# Patient Record
Sex: FEMALE | Race: WHITE | Employment: UNEMPLOYED | URBAN - METROPOLITAN AREA
[De-identification: names, ages, dates, MRNs, and addresses within clinical notes are randomized per-mention and may not be internally consistent; named-entity substitution may affect disease eponyms.]

---

## 2018-07-17 ENCOUNTER — OFFICE VISIT (OUTPATIENT)
Dept: ORTHOPEDIC SURGERY | Age: 43
End: 2018-07-17

## 2018-07-17 VITALS
HEIGHT: 66 IN | SYSTOLIC BLOOD PRESSURE: 118 MMHG | BODY MASS INDEX: 23.46 KG/M2 | DIASTOLIC BLOOD PRESSURE: 76 MMHG | WEIGHT: 146 LBS

## 2018-07-17 DIAGNOSIS — S93.315A: Primary | ICD-10-CM

## 2018-07-17 DIAGNOSIS — M25.572 ACUTE LEFT ANKLE PAIN: ICD-10-CM

## 2018-07-17 PROCEDURE — G8420 CALC BMI NORM PARAMETERS: HCPCS | Performed by: INTERNAL MEDICINE

## 2018-07-17 PROCEDURE — G8427 DOCREV CUR MEDS BY ELIG CLIN: HCPCS | Performed by: INTERNAL MEDICINE

## 2018-07-17 PROCEDURE — 1036F TOBACCO NON-USER: CPT | Performed by: INTERNAL MEDICINE

## 2018-07-17 PROCEDURE — 99203 OFFICE O/P NEW LOW 30 MIN: CPT | Performed by: INTERNAL MEDICINE

## 2018-07-17 NOTE — PROGRESS NOTES
lower extremity does not show any tenderness, deformity or injury. Range of motion is unremarkable. There is no gross instability. There are no rashes, ulcerations or lesions. Strength and tone are normal.     Neurolgic -Light touch sensation and manual muscle testing normal L2-S1. No fasiculations. Pattella tendon and Achilles tendon reflexes +2 bilaterally.   Seated SLR negative        Office Imaging Results/Procedures PerformedToday:        EXAMINATION:   3 XRAY VIEWS OF THE LEFT FOOT       7/13/2018 6:48 pm       COMPARISON:   Ankle radiograph from the same day       HISTORY:   ORDERING PHYSICIAN PROVIDED HISTORY: reduced   TECHNOLOGIST PROVIDED HISTORY:   Technologist Provided Reason for Exam: fell       FINDINGS:   The foot and ankle are in a splint.  There has been anatomic reduction of the   subtalar joint.  Question small fracture of the anterior process of the   calcaneus.           Impression   Reduction of subtalar dislocation.  Suspected small fracture of the anterior   process of the calcaneus           EXAMINATION:   2 XRAY VIEWS OF THE LEFT ANKLE       7/13/2018 6:02 pm       COMPARISON:   None.       HISTORY:   ORDERING PHYSICIAN PROVIDED HISTORY: fall ankle injury   TECHNOLOGIST PROVIDED HISTORY:   Technologist Provided Reason for Exam: fall down steps   Acuity: Acute   Type of Encounter: Initial       FINDINGS:   A lateral and limited the mortise view are submitted.  No fracture or   dislocation of the bones of the ankle are demonstrated.  There is an abnormal   relationship between the talus and calcaneus, with the dome of the talus at   the level of the superior aspect of the calcaneus, and anterior displacement   of the talus relative to the calcaneus.           Impression   No ankle fracture or dislocation.  Abnormal talocalcaneal relationship   suggesting subtalar dislocation.  Recommend dedicated views of the foot     EXAMINATION:   2 XRAY VIEWS OF THE LEFT ANKLE       7/13/2018 6:02 pm and all questions were answered. the patient indicated understanding and satisfaction with the discussion. Orders:        Orders Placed This Encounter   Procedures    MRI Ankle Left WO Contrast     Standing Status:   Future     Standing Expiration Date:   7/17/2019     Scheduling Instructions:      Gloria Eubanks schedule upon approval     Order Specific Question:   Reason for exam:     Answer:   subtalar dislocation r/o OCD fx           Disclaimer: \"This note was dictated with voice recognition software. Though review and correction are routine, we apologize for any errors. \"

## 2018-07-19 ENCOUNTER — TELEPHONE (OUTPATIENT)
Dept: ORTHOPEDIC SURGERY | Age: 43
End: 2018-07-19

## 2018-07-25 ENCOUNTER — OFFICE VISIT (OUTPATIENT)
Dept: ORTHOPEDIC SURGERY | Age: 43
End: 2018-07-25

## 2018-07-25 VITALS — HEIGHT: 66 IN | BODY MASS INDEX: 23.46 KG/M2 | WEIGHT: 146 LBS

## 2018-07-25 DIAGNOSIS — S93.315A CLOSED TRAUMATIC DISLOCATION OF LEFT SUBTALAR JOINT, INITIAL ENCOUNTER: Primary | ICD-10-CM

## 2018-07-25 DIAGNOSIS — Q66.89 COALITION, CALCANEUS NAVICULAR: ICD-10-CM

## 2018-07-25 PROCEDURE — L4361 PNEUMA/VAC WALK BOOT PRE OTS: HCPCS | Performed by: INTERNAL MEDICINE

## 2018-07-25 PROCEDURE — 99214 OFFICE O/P EST MOD 30 MIN: CPT | Performed by: INTERNAL MEDICINE

## 2018-07-25 PROCEDURE — G8427 DOCREV CUR MEDS BY ELIG CLIN: HCPCS | Performed by: INTERNAL MEDICINE

## 2018-07-25 PROCEDURE — 1036F TOBACCO NON-USER: CPT | Performed by: INTERNAL MEDICINE

## 2018-07-25 PROCEDURE — G8420 CALC BMI NORM PARAMETERS: HCPCS | Performed by: INTERNAL MEDICINE

## 2018-07-25 NOTE — PROGRESS NOTES
assessed      Neurovascular - non focal and intact       Additional Comments:        Additional Examinations:                Office Imaging Results/Procedures PerformedToday:   MRI reviewed by myself and corroborated with the report         [facility]           Patient Name: Claritza Roberts   Case ID: [id number]   Patient : 1975   Referring Physician: Bambi Patel MD   Exam Date: 2018   Exam Description: MR Left Ankle w/o Contrast            HISTORY:  Evaluate OCD.       TECHNICAL FACTORS:  Long- and short-axis fat- and water-weighted images were performed. 1.5T    High Field Oval.       COMPARISON:  None.       FINDINGS:  Small areas of microtrabecular fracture of posteromedial and posterolateral aspects    of the talus.  No osteochondral defects on the talar dome.  Cartilaginous or fibrous coalition    of calcaneonavicular process with osseous erosion at the synchondrosis.  Contused lateral    malleolus.       Achilles tendon intact.  Mild-moderate inflammation of the central cord plantar fascia with    mild stress edema of the adjacent calcaneus.       Chronic mild-moderate insertional tendinopathy of posterior tibialis tendon, no tear.  Flexor    digitorum and hallucis tendon intact.  Moderate-large amount of fluid in the flexor hallucis    tendon sheath at knot of Chidi Mates tendons and anterior tendon group intact.       Tear of superficial fibers of deltoid ligament complex.  The deep tibiotalar ligament intact.     Sprained cervical ligament and interosseous ligament with mild osseous edema of adjacent talus    and calcaneus.  Sprain medial and lateral retinaculum.  The anterior tibiofibular ligament,    anterior talofibular ligament and calcaneofibular ligament intact.  Diffuse severe soft tissue    edema.       CONCLUSION:   1. Tear of superficial deltoid ligament complex.    2. High grade sprain of interosseous and cervical ligaments in tell stretch is handouts provided  Scheduled daily aspirin 81 mg daily for one more week when necessary thereafter  Medical pain management as per previous    Greater than half the time related to patient's visit was spent counseling the patient with respect to alternatives of treatment and options for treatment and complications and risks related to those treatment options and expectations related to recovery and outcomes relative to those treatment options     Orders:        Orders Placed This Encounter   Procedures    Airselect Tall Pneumatic Walking Boot     Patient was prescribed a Magalene Aase Tall Walking Boot. The left will require stabilization / immobilization from this semi-rigid / rigid orthosis to improve their function. The orthosis will assist in protecting the affected area, provide functional support and facilitate healing. Patient was instructed to progress ambulation  as partial weight bearing in the device. The patient was educated and fit by a healthcare professional with expert knowledge and specialization in brace application while under the direct supervision of the physician. Verbal and written instructions for the use of and application of this item were provided. They were instructed to contact the office immediately should the brace result in increased pain, decreased sensation, increased swelling or worsening of the condition. Vielka Doan MD.      Disclaimer: \"This note was dictated with voice recognition software. Though review and correction are routine, we apologize for any errors. \"

## 2018-07-26 ENCOUNTER — TELEPHONE (OUTPATIENT)
Dept: ORTHOPEDIC SURGERY | Age: 43
End: 2018-07-26

## 2018-08-06 ENCOUNTER — OFFICE VISIT (OUTPATIENT)
Dept: ORTHOPEDIC SURGERY | Age: 43
End: 2018-08-06

## 2018-08-06 VITALS — WEIGHT: 146 LBS | HEIGHT: 66 IN | BODY MASS INDEX: 23.46 KG/M2

## 2018-08-06 DIAGNOSIS — Q66.89 COALITION, CALCANEUS NAVICULAR: ICD-10-CM

## 2018-08-06 DIAGNOSIS — S93.315A CLOSED TRAUMATIC DISLOCATION OF LEFT SUBTALAR JOINT, INITIAL ENCOUNTER: Primary | ICD-10-CM

## 2018-08-06 DIAGNOSIS — M25.572 ACUTE LEFT ANKLE PAIN: ICD-10-CM

## 2018-08-06 PROCEDURE — 99213 OFFICE O/P EST LOW 20 MIN: CPT | Performed by: INTERNAL MEDICINE

## 2018-08-06 PROCEDURE — G8427 DOCREV CUR MEDS BY ELIG CLIN: HCPCS | Performed by: INTERNAL MEDICINE

## 2018-08-06 PROCEDURE — 1036F TOBACCO NON-USER: CPT | Performed by: INTERNAL MEDICINE

## 2018-08-06 PROCEDURE — G8420 CALC BMI NORM PARAMETERS: HCPCS | Performed by: INTERNAL MEDICINE

## 2018-08-06 PROCEDURE — L1902 AFO ANKLE GAUNTLET PRE OTS: HCPCS | Performed by: INTERNAL MEDICINE

## 2018-08-06 NOTE — PROGRESS NOTES
Chief Complaint:   Chief Complaint   Patient presents with    Ankle Pain     f/u L Heel/ankle fracture           History of Present Illness:       Patient is a 41 y.o. female returns follow up for the above complaint. The patient was last seen approximately 2 weeksago. The symptoms are improving since the last visit. The patient has had no further testing for the problem. She is nearly 1 month out from her original injury and increasing more functional and having less pain. In fact she has prematurely weaned from crutches in the interim since her last visit however without consequence fortunately. She denies any subjective instability and only low-grade discomfort. She is very pleased with the improvement    Brace boot was removed in the office she is able to fully weight-bear unprotected with only minimal subjective stiffness and essentially pain-free    No new injuries no new events she is planning to leave the country for vacation in [facility] her this week    She denies mechanical symptoms or neuritic character symptoms     Past Medical History:      No past medical history on file. Present Medications:         Current Outpatient Prescriptions   Medication Sig Dispense Refill    ibuprofen (ADVIL;MOTRIN) 800 MG tablet Take 1 tablet by mouth every 8 hours as needed for Pain or Fever 20 tablet 0     No current facility-administered medications for this visit. Allergies:      No Known Allergies        Review of Systems:    Pertinent items are noted in HPI         Vital Signs: There were no vitals filed for this visit.      General Exam:     Constitutional: Patient is adequately groomed with no evidence of malnutrition    Physical Exam: left ankle      Primary Exam:    Inspection:  Interval decrease soft tissue swelling no deformity      Palpation:  No focal tenderness over the medial or lateral subtalar joint regions      Range of Motion:  Globally increased with minimal discomfort      Strength:  Near normal with only low-grade discomfort      Special Tests: Anterior drawer tilt stable      Skin: There are no rashes, ulcerations or lesions. Gait: Near normal with him protected weightbearing     Neurovascular - non focal and intact       Additional Comments:        Additional Examinations:                   Office Imaging Results/Procedures PerformedToday:      Radiology:      X-rays obtained and reviewed in office:   Views 3 views   Location Left ankle   Impression The ankle mortise is anatomic mild soft tissue swelling bimalleolar subtalar joint is congruent and has a normal radiographic appearance the partial coalition calcaneonavicular is not appreciated on these x-ray projections incidental note of a vertical avulsion fracture fragment lateral fibular tip. Office Procedures:     Orders Placed This Encounter   Procedures    XR ANKLE LEFT (MIN 3 VIEWS)    Aircast Air Sport Ankle Brace     Patient was prescribed an Aircast Air Sport Brace. The left ankle will require stabilization / immobilization from this semi-rigid / rigid orthosis to improve their function. The orthosis will assist in protecting the affected area, provide functional support and facilitate healing. Patient was instructed to progress ambulation weight bearing as tolerated in the device. The patient was educated and fit by a healthcare professional with expert knowledge and specialization in brace application while under the direct supervision of the treating physician. Verbal and written instructions for the use of and application of this item were provided. They were instructed to contact the office immediately should the brace result in increased pain, decreased sensation, increased swelling or worsening of the condition. Other Outside Imaging and Testing Personally Reviewed:       none          Assessment   Impression: . Encounter Diagnoses   Name Primary?     Closed traumatic dislocation of left subtalar joint, initial encounter Yes    Coalition, calcaneus navicular     Acute left ankle pain         Lateral subtalar joint dislocation      Plan:     She can progress to full weightbearing with brace boot protection  Transition to functional ankle bracing within the home/indoors as symptoms tolerate  Anticipate weaning out of boot program on follow-up and ankle conditioning program         Orders:        Orders Placed This Encounter   Procedures    XR ANKLE LEFT (MIN 3 VIEWS)    Aircast Air Sport Ankle Brace     Patient was prescribed an Aircast Air Sport Brace. The left ankle will require stabilization / immobilization from this semi-rigid / rigid orthosis to improve their function. The orthosis will assist in protecting the affected area, provide functional support and facilitate healing. Patient was instructed to progress ambulation weight bearing as tolerated in the device. The patient was educated and fit by a healthcare professional with expert knowledge and specialization in brace application while under the direct supervision of the treating physician. Verbal and written instructions for the use of and application of this item were provided. They were instructed to contact the office immediately should the brace result in increased pain, decreased sensation, increased swelling or worsening of the condition. Kadeem Austin MD.      Disclaimer: \"This note was dictated with voice recognition software. Though review and correction are routine, we apologize for any errors. \"

## 2018-08-23 ENCOUNTER — OFFICE VISIT (OUTPATIENT)
Dept: ORTHOPEDIC SURGERY | Age: 43
End: 2018-08-23

## 2018-08-23 DIAGNOSIS — M25.572 ACUTE LEFT ANKLE PAIN: ICD-10-CM

## 2018-08-23 DIAGNOSIS — Q66.89 COALITION, CALCANEUS NAVICULAR: ICD-10-CM

## 2018-08-23 DIAGNOSIS — S93.315A CLOSED TRAUMATIC DISLOCATION OF LEFT SUBTALAR JOINT, INITIAL ENCOUNTER: Primary | ICD-10-CM

## 2018-08-23 PROCEDURE — G8420 CALC BMI NORM PARAMETERS: HCPCS | Performed by: INTERNAL MEDICINE

## 2018-08-23 PROCEDURE — G8428 CUR MEDS NOT DOCUMENT: HCPCS | Performed by: INTERNAL MEDICINE

## 2018-08-23 PROCEDURE — 99213 OFFICE O/P EST LOW 20 MIN: CPT | Performed by: INTERNAL MEDICINE

## 2018-08-23 PROCEDURE — 1036F TOBACCO NON-USER: CPT | Performed by: INTERNAL MEDICINE

## 2018-08-25 NOTE — PROGRESS NOTES
Chief Complaint:   Chief Complaint   Patient presents with    Foot Pain     f/u L ankle fracture           History of Present Illness:       Patient is a 41 y.o. female returns follow up for the above complaint. The patient was last seen approximately 3 weeksago. The symptoms are improving since the last visit. The patient has had no further testing for the problem. She is now nearly 6 weeks out from her original injury. She is very pleased with the improvement no new injuries no new events    She has transition to functional ankle bracing recently but was very diligent with use of her brace boot during her recent vacation [place]    She is very pleased with the improvement she denies any subjective instability only some low-grade stiffness and low-grade subjective weakness no new injuries no new events     Past Medical History:      No past medical history on file. Present Medications:         Current Outpatient Prescriptions   Medication Sig Dispense Refill    ibuprofen (ADVIL;MOTRIN) 800 MG tablet Take 1 tablet by mouth every 8 hours as needed for Pain or Fever 20 tablet 0     No current facility-administered medications for this visit. Allergies:      No Known Allergies        Review of Systems:    Pertinent items are noted in HPI       Vital Signs: There were no vitals filed for this visit. General Exam:     Constitutional: Patient is adequately groomed with no evidence of malnutrition    Physical Exam: left ankle      Primary Exam:    Inspection:  Interval decrease soft tissue swelling no deformity or atrophy      Palpation:  No focal tenderness      Range of Motion:  Globally improved and near-normal, subtalar joint motion good without pain      Strength:  Near normal without pain      Special Tests:  Single heel rise with only mild weakness, anterior drawer and talar tilt stable      Skin: There are no rashes, ulcerations or lesions.       Gait: Near normal      Neurovascular - non

## 2018-08-30 ENCOUNTER — HOSPITAL ENCOUNTER (OUTPATIENT)
Dept: PHYSICAL THERAPY | Age: 43
Setting detail: THERAPIES SERIES
Discharge: HOME | End: 2018-08-30
Payer: COMMERCIAL

## 2018-08-30 PROCEDURE — G8979 MOBILITY GOAL STATUS: HCPCS | Performed by: PHYSICAL THERAPIST

## 2018-08-30 PROCEDURE — 97110 THERAPEUTIC EXERCISES: CPT | Performed by: PHYSICAL THERAPIST

## 2018-08-30 PROCEDURE — G8978 MOBILITY CURRENT STATUS: HCPCS | Performed by: PHYSICAL THERAPIST

## 2018-08-30 PROCEDURE — 97161 PT EVAL LOW COMPLEX 20 MIN: CPT | Performed by: PHYSICAL THERAPIST

## 2018-08-30 NOTE — FLOWSHEET NOTE
[facility], [address] [place]  Phone: [phone]  Fax [phone]    Physical Therapy Daily Treatment Note  Date:  2018    Patient Name:  Tamera Wren    :  1975  MRN: [id number]  Restrictions/Precautions:    Physician Information:  Referring Practitioner: Lonn Ormond, MD  Medical/Treatment Diagnosis Information:  Diagnosis: W18.072L (ICD-10-CM) - Closed traumatic dislocation of left subtalar joint, subsequent encounter, Q66.89 (ICD-10-CM) - Coalition, calcaneus navicular  · Treatment Diagnosis: L ankle stiffness  ICD-10-CM Diagnosis Code M25.672, gait abnormality 2018 ICD-10-CM Diagnosis Code R26.9, L LE weakness   [x] Conservative / [] Surgical - DOS:  Therapy Diagnosis/Practice Pattern:  Practice Pattern G: Fracture  Insurance/Certification information:  PT Insurance Information: [facility]  Plan of care signed: [] YES  [] NO  Number of Comorbidities:  [x]0     []1-2    []3+  Date of Patient follow up with Physician:     G-Code (if applicable):      Date G-Code Applied:  18  PT G-Codes  Functional Assessment Tool Used: LEFS  Score: 18%  Functional Limitation: Mobility: Walking and moving around  Mobility: Walking and Moving Around Current Status (): At least 1 percent but less than 20 percent impaired, limited or restricted  Mobility: Walking and Moving Around Goal Status (): At least 1 percent but less than 20 percent impaired, limited or restricted    Progress Note: [x]  Yes  []  No  Next due by: Visit #10        Latex Allergy:  [x]NO      []YES  Preferred Language for Healthcare:   [x]English       []other:    Visit # Insurance Allowable Reporting Period   1 ?  Begin Date: 2018               End Date:      RECERT DUE BY: 8 weeks from 18    SUBJECTIVE:  See eval    OBJECTIVE: See eval  Observation:  Palpation:     Test used Initial score Current Score   Pain Summary functioning as indicated by patients Functional Deficits. 3. Patient will demonstrate an increase in Strength to good proximal hip strength and control >/=4+/5 allow for proper functional mobility as indicated by patients Functional Deficits. 4. Patient will return to walking, stair climbing and CKC functional activities without increased symptoms or restriction. 5. Pt. Would like to return to previous exercise routine without limitation or re-injury(patient specific functional goal)       New or Updated Goals (if applicable):  [x] No change to goals established upon initial eval/last progress note:  New Goals:    Progression Towards Functional goals:   [] Patient is progressing as expected towards functional goals listed. [] Progression is slowed due to complexities listed. [] Progression has been slowed due to co-morbidities.   [x] Plan just implemented, too soon to assess goals progression  [] Other:     ASSESSMENT:    [] Improvement noted relative to goals:  [] No Improvement noted related to goals:  Summary/Patient's response to treatment: See Eval    Treatment/Activity Tolerance:  [x] Patient tolerated treatment well [] Patient limited by fatique  [] Patient limited by pain  [] Patient limited by other medical complications  [] Other:     Prognosis: [x] Good [] Fair  [] Poor    Patient Requires Follow-up: [x] Yes  [] No    PLAN: See eval  [] Continue per plan of care [] Alter current plan (see comments)  [x] Plan of care initiated [] Hold pending MD visit [] Discharge    Electronically signed by: Yessenia Petit, PT [id number]

## 2018-08-30 NOTE — PLAN OF CARE
Hip IR     Knee EXT (quad)     Knee Flex (HS)     Ankle DF 4 5   Ankle PF 4- 5   Ankle Inv 4- 5   Ankle EV 3+ 5        Circumference  Mal     25  49    LE Dermatomes WNL    LE myotomes WNL        Flexibility L R Comment   Hamstring -10  90/90   Gastroc tight       ITB tight      Quad WFL tight    Hip flexor tight      Glut KTC WNL      KTOS WNL      Piriformis ig 4 tight      Jamal  (+)       SLS R min-mod ankle strategies, L 1-2 seconds then UE assist  SL heel raise WNL R, compensates with knee flexion L and unable to attain full range  Reflexes/Sensation:    [x]Dermatomes/Myotomes intact    []Reflexes equal and normal bilaterally   []Other:    Joint mobility: L subtalar jt   []Normal    [x]Hypo   []Hyper    Palpation: no focal tenderness    Functional Mobility/Transfers: independent    Posture: normal    Bandages/Dressings/Incisions: none    Gait: (include devices/WB status) slight decreased toe off L, slight decreased stance time L and step length R, decreased hip ext. L    Orthopedic Special Tests:   HIP KNEE ANKLE   Test Result Test Result Test Result   Scour  Lachman's  Anterior Drawer (-)   Log Roll  MCMurrays's  Talar Tilt stiff   GIANNI  Valgus   Squeeze Test (-)   FADIR  Varus      Violet's  Posterior Drawer        Patellar Apprehension                               [x] Patient history, allergies, meds reviewed. Medical chart reviewed. See intake form. Review Of Systems (ROS):  [x]Performed Review of systems (Integumentary, CardioPulmonary, Neurological) by intake and observation. Intake form has been scanned into medical record. Patient has been instructed to contact their primary care physician regarding ROS issues if not already being addressed at this time.       Co-morbidities/Complexities (which will affect course of rehabilitation):   [x]None           Arthritic conditions   []Rheumatoid arthritis (M05.9)  []Osteoarthritis (M19.91)   Cardiovascular conditions   []Hypertension with:  [x] no personal factors and/or comorbidities that impact the plan of care;  []1-2 personal factors and/or comorbidities that impact the plan of care  []3 personal factors and/or comorbidities that impact the plan of care  [x] An examination of body systems using standardized tests and measures addressing any of the following: body structures and functions (impairments), activity limitations, and/or participation restrictions;:  [] a total of 1-2 or more elements   [x] a total of 3 or more elements   [] a total of 4 or more elements   [x] A clinical presentation with:  [x] stable and/or uncomplicated characteristics   [] evolving clinical presentation with changing characteristics  [] unstable and unpredictable characteristics;   [x] Clinical decision making of [x] low, [] moderate, [] high complexity using standardized patient assessment instrument and/or measurable assessment of functional outcome. [x] EVAL (LOW) 27420 (typically 20 minutes face-to-face)  [] EVAL (MOD) 88856 (typically 30 minutes face-to-face)  [] EVAL (HIGH) 21561 (typically 45 minutes face-to-face)  [] RE-EVAL     PLAN  Frequency/Duration:  2 days per week for 8 Weeks:  Interventions:  [x]  Therapeutic exercise including: strength training, ROM, for Lower extremity and core   [x]  NMR activation and proprioception for LE, Glutes and Core   [x]  Manual therapy as indicated for LE, Hip and spine to include: Dry Needling/IASTM, STM, PROM, Gr I-IV mobilizations, manipulation. [x] Modalities as needed that may include: thermal agents, E-stim, Biofeedback, US, iontophoresis as indicated  [x] Patient education on joint protection, postural re-education, activity modification, progression of HEP. HEP instruction: Reviewed HEP and pt given handout. GOALS:  Patient stated goal: Full ankle movement for running, jumping etc.     Therapist goals for Patient:   Short Term Goals: To be achieved in: 2 weeks  1.  Independent in HEP and

## 2018-09-04 ENCOUNTER — HOSPITAL ENCOUNTER (OUTPATIENT)
Dept: PHYSICAL THERAPY | Age: 43
Setting detail: THERAPIES SERIES
Discharge: HOME | End: 2018-09-04
Payer: COMMERCIAL

## 2018-09-04 PROCEDURE — 97110 THERAPEUTIC EXERCISES: CPT | Performed by: PHYSICAL THERAPIST

## 2018-09-04 PROCEDURE — 97140 MANUAL THERAPY 1/> REGIONS: CPT | Performed by: PHYSICAL THERAPIST

## 2018-09-04 NOTE — FLOWSHEET NOTE
weight control with ambulation re-education including up and down stairs     Home Exercise Program:    [x] (74576) Reviewed/Progressed HEP activities related to strengthening, flexibility, endurance, ROM of core, proximal hip and LE for functional self-care, mobility, lifting and ambulation/stair navigation   [] (44491)Reviewed/Progressed HEP activities related to improving balance, coordination, kinesthetic sense, posture, motor skill, proprioception of core, proximal hip and LE for self care, mobility, lifting, and ambulation/stair navigation      Manual Treatments:  PROM / STM / Oscillations-Mobs:  G-I, II, III, IV (PA's, Inf., Post.)  [x] (17672) Provided manual therapy to mobilize LE, proximal hip and/or LS spine soft tissue/joints for the purpose of modulating pain, promoting relaxation,  increasing ROM, reducing/eliminating soft tissue swelling/inflammation/restriction, improving soft tissue extensibility and allowing for proper ROM for normal function with self care, mobility, lifting and ambulation. Modalities:  Declined to ice at home PRN    Charges:  Timed Code Treatment Minutes: 38   Total Treatment Minutes: 38     [] EVAL (LOW) 80271 (typically 20 minutes face-to-face)  [] EVAL (MOD) 84020 (typically 30 minutes face-to-face)  [] EVAL (HIGH) 09156 (typically 45 minutes face-to-face)  [] RE-EVAL     [x] GG(66262) x  2   [] IONTO  [] NMR (80242) x      [] VASO  [x] Manual (57198) x  1    [] Other:  [] TA x       [] Mech Traction (31038)  [] ES(attended) (86575)      [] ES (un) (24081):     GOALS:   Patient stated goal: Full ankle movement for running, jumping etc.     Therapist goals for Patient:   Short Term Goals: To be achieved in: 2 weeks  1. Independent in HEP and progression per patient tolerance, in order to prevent re-injury. 2. Patient will have a decrease in pain to facilitate improvement in movement, function, and ADLs as indicated by Functional Deficits. Long Term Goals:  To be achieved 0481

## 2018-09-06 ENCOUNTER — HOSPITAL ENCOUNTER (OUTPATIENT)
Dept: PHYSICAL THERAPY | Age: 43
Setting detail: THERAPIES SERIES
Discharge: HOME | End: 2018-09-06
Payer: COMMERCIAL

## 2018-09-06 PROCEDURE — 97140 MANUAL THERAPY 1/> REGIONS: CPT | Performed by: PHYSICAL THERAPIST

## 2018-09-06 PROCEDURE — 97110 THERAPEUTIC EXERCISES: CPT | Performed by: PHYSICAL THERAPIST

## 2018-09-11 ENCOUNTER — HOSPITAL ENCOUNTER (OUTPATIENT)
Dept: PHYSICAL THERAPY | Age: 43
Setting detail: THERAPIES SERIES
Discharge: HOME | End: 2018-09-11
Payer: COMMERCIAL

## 2018-09-11 PROCEDURE — 97110 THERAPEUTIC EXERCISES: CPT | Performed by: PHYSICAL THERAPIST

## 2018-09-11 PROCEDURE — 97140 MANUAL THERAPY 1/> REGIONS: CPT | Performed by: PHYSICAL THERAPIST

## 2018-09-11 NOTE — FLOWSHEET NOTE
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, calcaneus navicular  · Treatment Diagnosis: L ankle stiffness  ICD-10-CM Diagnosis Code M25.672, gait abnormality 2018 ICD-10-CM Diagnosis Code R26.9, L LE weakness   [x] Conservative / [] Surgical - DOS:  Therapy Diagnosis/Practice Pattern:  Practice Pattern G: Fracture  Insurance/Certification information:  PT Insurance Information: [facility]  Plan of care signed: [] YES  [] NO  Number of Comorbidities:  [x]0     []1-2    []3+  Date of Patient follow up with Physician: 18    G-Code (if applicable):      Date G-Code Applied:  18  PT G-Codes  Functional Assessment Tool Used: LEFS  Score: 18%  Functional Limitation: Mobility: Walking and moving around  Mobility: Walking and Moving Around Current Status (): At least 1 percent but less than 20 percent impaired, limited or restricted  Mobility: Walking and Moving Around Goal Status (): At least 1 percent but less than 20 percent impaired, limited or restricted    Progress Note: [x]  Yes  []  No  Next due by: Visit #10        Latex Allergy:  [x]NO      []YES  Preferred Language for Healthcare:   [x]English       []other:    Visit # Insurance Allowable Reporting Period   4 ? Begin Date: 2018               End Date:      RECERT DUE BY: 8 weeks from 18    SUBJECTIVE:  Pt. Reports that she is doing well, the stiffness is getting better. Seeing MD in about a week.   \"I have to Activities:    [x] (37320 or 36681) Provided verbal/tactile cueing for activities related to improving balance, coordination, kinesthetic sense, posture, motor skill, proprioception and motor activation to allow for proper function of core, proximal hip and LE with self care and ADLs  [] (17878) Gait Re-education- Provided training and instruction to the patient for proper LE, core and proximal hip recruitment and positioning and eccentric body weight control with ambulation re-education including up and down stairs     Home Exercise Program:    [x] (71068) Reviewed/Progressed HEP activities related to strengthening, flexibility, endurance, ROM of core, proximal hip and LE for functional self-care, mobility, lifting and ambulation/stair navigation   [] (74010)Reviewed/Progressed HEP activities related to improving balance, coordination, kinesthetic sense, posture, motor skill, proprioception of core, proximal hip and LE for self care, mobility, lifting, and ambulation/stair navigation      Manual Treatments:  PROM / STM / Oscillations-Mobs:  G-I, II, III, IV (PA's, Inf., Post.)  [x] (18932) Provided manual therapy to mobilize LE, proximal hip and/or LS spine soft tissue/joints for the purpose of modulating pain, promoting relaxation,  increasing ROM, reducing/eliminating soft tissue swelling/inflammation/restriction, improving soft tissue extensibility and allowing for proper ROM for normal function with self care, mobility, lifting and ambulation.      Modalities:  Declined to ice at home PRN    Charges:  Timed Code Treatment Minutes: 30   Total Treatment Minutes: 30     [] EVAL (LOW) 45736 (typically 20 minutes face-to-face)  [] EVAL (MOD) 36687 (typically 30 minutes face-to-face)  [] EVAL (HIGH) 48260 (typically 45 minutes face-to-face)  [] RE-EVAL     [x] LS(27549) x  1   [] IONTO  [] NMR (52014) x      [] VASO  [x] Manual (18843) x  1    [] Other:  [] TA x       [] Mech Traction (74603)  [] ES(attended) (33559) [] ES (un) (45243):     GOALS:   Patient stated goal: Full ankle movement for running, jumping etc.     Therapist goals for Patient:   Short Term Goals: To be achieved in: 2 weeks  1. Independent in HEP and progression per patient tolerance, in order to prevent re-injury. 2. Patient will have a decrease in pain to facilitate improvement in movement, function, and ADLs as indicated by Functional Deficits. Long Term Goals: To be achieved in: 8 weeks  1. Disability index score of 9% or less for the LEFS to assist with reaching prior level of function. 2. Patient will demonstrate increased AROM to DF 15, PF 40, inv 30, Ev 20 to allow for proper joint functioning as indicated by patients Functional Deficits. 3. Patient will demonstrate an increase in Strength to good proximal hip strength and control >/=4+/5 allow for proper functional mobility as indicated by patients Functional Deficits. 4. Patient will return to walking, stair climbing and CKC functional activities without increased symptoms or restriction. 5. Pt. Would like to return to previous exercise routine without limitation or re-injury(patient specific functional goal)       New or Updated Goals (if applicable):  [x] No change to goals established upon initial eval/last progress note:  New Goals:    Progression Towards Functional goals:   [] Patient is progressing as expected towards functional goals listed. [x] Progression is slowed due to complexities listed. [] Progression has been slowed due to co-morbidities. [] Plan just implemented, too soon to assess goals progression  [] Other:     ASSESSMENT:    [] Improvement noted relative to goals:  [] No Improvement noted related to goals:  Summary/Patient's response to treatment: Increasing ankle ROM as measured above with DF/PF. Able to progress difficulty on BAPS, challenged by INV/EV direction. Limited with end range strength Ecc HR.       Treatment/Activity Tolerance:  [x] Patient tolerated treatment well [] Patient limited by fatique  [] Patient limited by pain  [] Patient limited by other medical complications  [] Other:     Prognosis: [x] Good [] Fair  [] Poor    Patient Requires Follow-up: [x] Yes  [] No    PLAN: See eval-OP note NV  [x] Continue per plan of care [] Alter current plan (see comments)  [] Plan of care initiated [] Hold pending MD visit [] Discharge    Electronically signed by: Twyla Walden, PT [id number]

## 2018-09-13 ENCOUNTER — HOSPITAL ENCOUNTER (OUTPATIENT)
Dept: PHYSICAL THERAPY | Age: 43
Setting detail: THERAPIES SERIES
Discharge: HOME | End: 2018-09-13
Payer: COMMERCIAL

## 2018-09-13 PROCEDURE — 97110 THERAPEUTIC EXERCISES: CPT | Performed by: PHYSICAL THERAPIST

## 2018-09-13 PROCEDURE — 97140 MANUAL THERAPY 1/> REGIONS: CPT | Performed by: PHYSICAL THERAPIST

## 2018-09-13 NOTE — FLOWSHEET NOTE
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, calcaneus navicular  · Treatment Diagnosis: L ankle stiffness  ICD-10-CM Diagnosis Code M25.672, gait abnormality 2018 ICD-10-CM Diagnosis Code R26.9, L LE weakness   [x] Conservative / [] Surgical - DOS:  Therapy Diagnosis/Practice Pattern:  Practice Pattern G: Fracture  Insurance/Certification information:  PT Insurance Information: [facility]  Plan of care signed: [] YES  [] NO  Number of Comorbidities:  [x]0     []1-2    []3+  Date of Patient follow up with Physician: 18    G-Code (if applicable):      Date G-Code Applied:  18  PT G-Codes  Functional Assessment Tool Used: LEFS  Score: 18%  Functional Limitation: Mobility: Walking and moving around  Mobility: Walking and Moving Around Current Status (): At least 1 percent but less than 20 percent impaired, limited or restricted  Mobility: Walking and Moving Around Goal Status (): At least 1 percent but less than 20 percent impaired, limited or restricted    Progress Note: [x]  Yes  []  No  Next due by: Visit #10        Latex Allergy:  [x]NO      []YES  Preferred Language for Healthcare:   [x]English       []other:    Visit # Insurance Allowable Reporting Period   5 ? Begin Date: 2018               End Date:      RECERT DUE BY: 8 weeks from 18    SUBJECTIVE:  The ankle is moving better, no pain, mostly fatigue and limited balance with advanced exercises.   Has to leave by 7:30 again.     OBJECTIVE:   Observation:ambulates with decreased hip ext L and early heel rise-improving  Palpation:     Test used Initial score Current Score   Pain Summary VAS 0-1/10 0/10   Functional questionnaire LEFS 18%    ROM DF 12 18    PF 23 27   Strength DF/PF 4/4- 4+/4-4+    ABD 4 4-4+    INV/EV 4-/3+ 4/4-        RESTRICTIONS/PRECAUTIONS: progress CK ex as carisa with functional brace    Exercises/Interventions:     Therapeutic Ex Sets/reps Notes   Supine HS stretch HEP    Supine ITB stretch with strap HEP    Supine HF stretch HEP    gastroc/soleus belt stretch HEP    Incline gastroc/soleus stretch :30x4 ea    Fig 4 stretch HEP         SLR extension, glut lift, abd HEP 2#   clams HEP 2#   Ankle 4 way HEP red VL (GTB for HEP)   Seated HR/TR dndx20 ea With 1/2 roll     Seated BAPS  x25 ea  L3 DF/PF, INV/EV, CW/CCW   Towel scrunch Ed for HEP              L stance with fwd/retro step dndx30 With wedge for EV   RXX with tr plane rot R dnd20    LXX with wedge for EV knee  to 2:73 [id number]    B HR/ecc HR x30/x25 Challenged by ecc   SLS L dnd:10x5    Tandem on airex dnd:10x5B         Manual Intervention     Mobs for DF/PF, proximal fibular glides, mid foot mobs, 1st ray PF 12'    MWM DF with step     Prone hip PA L with wedge/HF stretch prone dnd4'/:30x3                   NMR re-education     NMES      Standing DD with board PF/DF, INV/EV x15 ea    DD Marina Primus 1' ea         FSU/LSU Ed for HEP    Forward step down NV    Gliders  NV    reformer NV             Therapeutic Exercise and NMR EXR  [x] (10179) Provided verbal/tactile cueing for activities related to strengthening, flexibility, endurance, ROM for improvements in LE, proximal hip, and core control with self care, mobility, lifting, ambulation.  [] (30375) Provided verbal/tactile cueing for activities related to improving balance, coordination, kinesthetic sense, posture, motor skill, proprioception  to assist with LE, proximal hip, and core control in self care, mobility, lifting, ambulation and eccentric single leg control. NMR and Therapeutic Activities:    [x] (19924 or 22632) Provided verbal/tactile cueing for activities related to improving balance, coordination, kinesthetic sense, posture, motor skill, proprioception and motor activation to allow for proper function of core, proximal hip and LE with self care and ADLs  [] (50035) Gait Re-education- Provided training and instruction to the patient for proper LE, core and proximal hip recruitment and positioning and eccentric body weight control with ambulation re-education including up and down stairs     Home Exercise Program:    [x] (46784) Reviewed/Progressed HEP activities related to strengthening, flexibility, endurance, ROM of core, proximal hip and LE for functional self-care, mobility, lifting and ambulation/stair navigation   [] (97546)Reviewed/Progressed HEP activities related to improving balance, coordination, kinesthetic sense, posture, motor skill, proprioception of core, proximal hip and LE for self care, mobility, lifting, and ambulation/stair navigation      Manual Treatments:  PROM / STM / Oscillations-Mobs:  G-I, II, III, IV (PA's, Inf., Post.)  [x] (36100) Provided manual therapy to mobilize LE, proximal hip and/or LS spine soft tissue/joints for the purpose of modulating pain, promoting relaxation,  increasing ROM, reducing/eliminating soft tissue swelling/inflammation/restriction, improving soft tissue extensibility and allowing for proper ROM for normal function with self care, mobility, lifting and ambulation.      Modalities:  Declined to ice at home PRN    Charges:  Timed Code Treatment Minutes: 30   Total Treatment Minutes: 30     [] EVAL (LOW) 57797 (typically 20 minutes face-to-face)  [] EVAL (MOD) 04722 (typically 30 minutes face-to-face)  [] EVAL (HIGH) 12819 (typically 45 minutes face-to-face)  [] RE-EVAL     [x] PZ(90676) x  1   [] IONTO  [] NMR (67361) x [] VASO  [x] Manual (70527) x  1    [] Other:  [] TA x       [] Mech Traction (23386)  [] ES(attended) (78055)      [] ES (un) (56466):     GOALS:   Patient stated goal: Full ankle movement for running, jumping etc.     Therapist goals for Patient:   Short Term Goals: To be achieved in: 2 weeks  1. Independent in HEP and progression per patient tolerance, in order to prevent re-injury. 2. Patient will have a decrease in pain to facilitate improvement in movement, function, and ADLs as indicated by Functional Deficits. Long Term Goals: To be achieved in: 8 weeks  1. Disability index score of 9% or less for the LEFS to assist with reaching prior level of function. 2. Patient will demonstrate increased AROM to DF 15, PF 40, inv 30, Ev 20 to allow for proper joint functioning as indicated by patients Functional Deficits. 3. Patient will demonstrate an increase in Strength to good proximal hip strength and control >/=4+/5 allow for proper functional mobility as indicated by patients Functional Deficits. 4. Patient will return to walking, stair climbing and CKC functional activities without increased symptoms or restriction. 5. Pt. Would like to return to previous exercise routine without limitation or re-injury(patient specific functional goal)       New or Updated Goals (if applicable):  [x] No change to goals established upon initial eval/last progress note:  New Goals:    Progression Towards Functional goals:   [] Patient is progressing as expected towards functional goals listed. [x] Progression is slowed due to complexities listed. [] Progression has been slowed due to co-morbidities. [] Plan just implemented, too soon to assess goals progression  [] Other:     ASSESSMENT:    [] Improvement noted relative to goals:  [] No Improvement noted related to goals:  Summary/Patient's response to treatment: progressing well, advanced exercises today without issue, fatigue with CKC ex.

## 2018-09-13 NOTE — OP NOTE
[facility], [address]  Phone: [phone]  Fax [phone]    · Date: 9/13/2018  Physician: Dr. 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          Test used Initial score Current Score   Pain Summary VAS 0-1/10 0/10   Functional questionnaire LEFS 18%     ROM DF 12 18     PF 23 27   Strength DF/PF 4/4- 4+/4-4+     ABD 4 4-4+     INV/EV 4-/3+ 4/4-         Functional Activity Checklist: The patient continues to have moderate difficulty with the following:   [] Personal care  [] Reaching / overhead  [] Standing    [] Housework chores  [] Climbing  [] Driving / riding in a vehicle    [] Work  [] Squatting  [] Bed / vehicle mobility    [] Lifting  [] Walking  [] Sleeping    [] Pushing / pulling  [] Sitting  [] Concentrating / reading     Specific Functional Improvement and Impression:  Pt. Is progressing well with Ankle ROM, however, still limited. Strength is improving gradually, as well as, eccentric control and tolerance to CKC exercises and basic balance ex. Pt. Is ambulating with improved push off L. Summary:   [x] Patient is progressing as expected for this condition   [] Patient is progressing, but slower than expected for this condition   [] Patient is not progressing  Clinician Recommendations:   [x] Continue rehabilitation due to objective improvement and continued functional deficits. [] Follow up periodically to advance home exercise program to match level of function.    [] Continue rehabitation due to objective

## 2018-09-17 ENCOUNTER — OFFICE VISIT (OUTPATIENT)
Dept: ORTHOPEDIC SURGERY | Age: 43
End: 2018-09-17

## 2018-09-17 VITALS — WEIGHT: 146 LBS | BODY MASS INDEX: 23.46 KG/M2 | HEIGHT: 66 IN

## 2018-09-17 DIAGNOSIS — Q66.89 COALITION, CALCANEUS NAVICULAR: ICD-10-CM

## 2018-09-17 DIAGNOSIS — S93.315D CLOSED TRAUMATIC DISLOCATION OF LEFT SUBTALAR JOINT, SUBSEQUENT ENCOUNTER: Primary | ICD-10-CM

## 2018-09-17 PROBLEM — S93.315A: Status: ACTIVE | Noted: 2018-09-17

## 2018-09-17 PROCEDURE — G8427 DOCREV CUR MEDS BY ELIG CLIN: HCPCS | Performed by: INTERNAL MEDICINE

## 2018-09-17 PROCEDURE — 99213 OFFICE O/P EST LOW 20 MIN: CPT | Performed by: INTERNAL MEDICINE

## 2018-09-17 PROCEDURE — G8420 CALC BMI NORM PARAMETERS: HCPCS | Performed by: INTERNAL MEDICINE

## 2018-09-17 PROCEDURE — 1036F TOBACCO NON-USER: CPT | Performed by: INTERNAL MEDICINE

## 2018-09-17 NOTE — PROGRESS NOTES
Chief Complaint:   Chief Complaint   Patient presents with    Foot Pain     f/u L heel pain          History of Present Illness:       Patient is a 41 y.o. female returns follow up for the above complaint. The patient was last seen approximately 1 monthsago. The symptoms are improving since the last visit. The patient has had no further testing for the problem. She is now nearly 2 months out from her original injury . No new injuries no new events increasingly more functional and having less pain    She has not returned to any impact form of activities she is tolerating modified participation Orren Banner. No instability episodes only low-grade soft tissue swelling she denies mechanical symptoms     Past Medical History:      No past medical history on file. Present Medications:         Current Outpatient Prescriptions   Medication Sig Dispense Refill    ibuprofen (ADVIL;MOTRIN) 800 MG tablet Take 1 tablet by mouth every 8 hours as needed for Pain or Fever 20 tablet 0     No current facility-administered medications for this visit. Allergies:      No Known Allergies        Review of Systems:    Pertinent items are noted in HPI       Vital Signs: There were no vitals filed for this visit. General Exam:     Constitutional: Patient is adequately groomed with no evidence of malnutrition    Physical Exam: left ankle      Primary Exam:    Inspection:  No deformity atrophy or appreciable effusion, minimal asymmetric soft tissue swelling      Palpation:  No focal tenderness over the subtalar joint or lateral ligamentous complex      Range of Motion:  Interval global improvement at the ankle-near normal subtalar joint range of motion      Strength:  Normal at the ankle without pain      Special Tests:  Single heel rise with good strength without pain      Skin: There are no rashes, ulcerations or lesions.       Gait: Nonantalgic      Neurovascular - non focal and intact       Additional Comments:

## 2018-09-18 ENCOUNTER — HOSPITAL ENCOUNTER (OUTPATIENT)
Dept: PHYSICAL THERAPY | Age: 43
Setting detail: THERAPIES SERIES
Discharge: HOME | End: 2018-09-18
Payer: COMMERCIAL

## 2018-09-18 PROCEDURE — 97140 MANUAL THERAPY 1/> REGIONS: CPT | Performed by: PHYSICAL THERAPIST

## 2018-09-18 PROCEDURE — 97110 THERAPEUTIC EXERCISES: CPT | Performed by: PHYSICAL THERAPIST

## 2018-09-18 NOTE — FLOWSHEET NOTE
lifting, ambulation.  [] (34774) Provided verbal/tactile cueing for activities related to improving balance, coordination, kinesthetic sense, posture, motor skill, proprioception  to assist with LE, proximal hip, and core control in self care, mobility, lifting, ambulation and eccentric single leg control. NMR and Therapeutic Activities:    [x] (05589 or 78113) Provided verbal/tactile cueing for activities related to improving balance, coordination, kinesthetic sense, posture, motor skill, proprioception and motor activation to allow for proper function of core, proximal hip and LE with self care and ADLs  [] (71491) Gait Re-education- Provided training and instruction to the patient for proper LE, core and proximal hip recruitment and positioning and eccentric body weight control with ambulation re-education including up and down stairs     Home Exercise Program:    [x] (99124) Reviewed/Progressed HEP activities related to strengthening, flexibility, endurance, ROM of core, proximal hip and LE for functional self-care, mobility, lifting and ambulation/stair navigation   [] (63306)Reviewed/Progressed HEP activities related to improving balance, coordination, kinesthetic sense, posture, motor skill, proprioception of core, proximal hip and LE for self care, mobility, lifting, and ambulation/stair navigation      Manual Treatments:  PROM / STM / Oscillations-Mobs:  G-I, II, III, IV (PA's, Inf., Post.)  [x] (21813) Provided manual therapy to mobilize LE, proximal hip and/or LS spine soft tissue/joints for the purpose of modulating pain, promoting relaxation,  increasing ROM, reducing/eliminating soft tissue swelling/inflammation/restriction, improving soft tissue extensibility and allowing for proper ROM for normal function with self care, mobility, lifting and ambulation.      Modalities:  Declined to ice at home PRN    Charges:  Timed Code Treatment Minutes: 33   Total Treatment Minutes: 33     [] EVAL (LOW) 07130 co-morbidities. [] Plan just implemented, too soon to assess goals progression  [] Other:     ASSESSMENT:    [] Improvement noted relative to goals:  [] No Improvement noted related to goals:  Summary/Patient's response to treatment: challenged with new exercises, fatigued. Still limited with eccentric step down and L end stance portion of gait cycle.    Treatment/Activity Tolerance:  [x] Patient tolerated treatment well [] Patient limited by fatique  [] Patient limited by pain  [] Patient limited by other medical complications  [] Other:     Prognosis: [x] Good [] Fair  [] Poor    Patient Requires Follow-up: [x] Yes  [] No    PLAN: See eval  [x] Continue per plan of care [] Alter current plan (see comments)  [] Plan of care initiated [] Hold pending MD visit [] Discharge    Electronically signed by: Yogi Dumont, PT [id number]

## 2018-09-20 ENCOUNTER — HOSPITAL ENCOUNTER (OUTPATIENT)
Dept: PHYSICAL THERAPY | Age: 43
Setting detail: THERAPIES SERIES
Discharge: HOME | End: 2018-09-20
Payer: COMMERCIAL

## 2018-09-20 PROCEDURE — 97140 MANUAL THERAPY 1/> REGIONS: CPT | Performed by: PHYSICAL THERAPIST

## 2018-09-20 PROCEDURE — 97110 THERAPEUTIC EXERCISES: CPT | Performed by: PHYSICAL THERAPIST

## 2018-09-20 NOTE — FLOWSHEET NOTE
ambulation.  [] (41528) Provided verbal/tactile cueing for activities related to improving balance, coordination, kinesthetic sense, posture, motor skill, proprioception  to assist with LE, proximal hip, and core control in self care, mobility, lifting, ambulation and eccentric single leg control. NMR and Therapeutic Activities:    [x] (24719 or 26421) Provided verbal/tactile cueing for activities related to improving balance, coordination, kinesthetic sense, posture, motor skill, proprioception and motor activation to allow for proper function of core, proximal hip and LE with self care and ADLs  [] (74726) Gait Re-education- Provided training and instruction to the patient for proper LE, core and proximal hip recruitment and positioning and eccentric body weight control with ambulation re-education including up and down stairs     Home Exercise Program:    [x] (78849) Reviewed/Progressed HEP activities related to strengthening, flexibility, endurance, ROM of core, proximal hip and LE for functional self-care, mobility, lifting and ambulation/stair navigation   [] (46448)Reviewed/Progressed HEP activities related to improving balance, coordination, kinesthetic sense, posture, motor skill, proprioception of core, proximal hip and LE for self care, mobility, lifting, and ambulation/stair navigation      Manual Treatments:  PROM / STM / Oscillations-Mobs:  G-I, II, III, IV (PA's, Inf., Post.)  [x] (55420) Provided manual therapy to mobilize LE, proximal hip and/or LS spine soft tissue/joints for the purpose of modulating pain, promoting relaxation,  increasing ROM, reducing/eliminating soft tissue swelling/inflammation/restriction, improving soft tissue extensibility and allowing for proper ROM for normal function with self care, mobility, lifting and ambulation.      Modalities:  Declined to ice at home PRN    Charges:  Timed Code Treatment Minutes: 34   Total Treatment Minutes: 34     [] EVAL (LOW) 62567 (typically 20 minutes face-to-face)  [] EVAL (MOD) 00456 (typically 30 minutes face-to-face)  [] EVAL (HIGH) 71854 (typically 45 minutes face-to-face)  [] RE-EVAL     [x] HY(59965) x  1   [] IONTO  [] NMR (11787) x      [] VASO  [x] Manual (12266) x  1    [] Other:  [] TA x       [] Mech Traction (84594)  [] ES(attended) (52771)      [] ES (un) (35496):     GOALS:   Patient stated goal: Full ankle movement for running, jumping etc.     Therapist goals for Patient:   Short Term Goals: To be achieved in: 2 weeks  1. Independent in HEP and progression per patient tolerance, in order to prevent re-injury. 2. Patient will have a decrease in pain to facilitate improvement in movement, function, and ADLs as indicated by Functional Deficits. Long Term Goals: To be achieved in: 8 weeks  1. Disability index score of 9% or less for the LEFS to assist with reaching prior level of function. 2. Patient will demonstrate increased AROM to DF 15, PF 40, inv 30, Ev 20 to allow for proper joint functioning as indicated by patients Functional Deficits. MET for DF, INV  3. Patient will demonstrate an increase in Strength to good proximal hip strength and control >/=4+/5 allow for proper functional mobility as indicated by patients Functional Deficits. Still limited fwith PF  4. Patient will return to walking, stair climbing and CKC functional activities without increased symptoms or restriction. Still ambulating with altered gait pattern-improving gradually  5. Pt. Would like to return to previous exercise routine without limitation or re-injury(patient specific functional goal)       New or Updated Goals (if applicable):  [x] No change to goals established upon initial eval/last progress note:  New Goals:    Progression Towards Functional goals:   [] Patient is progressing as expected towards functional goals listed. [x] Progression is slowed due to complexities listed.   [] Progression has been slowed due to

## 2018-09-25 ENCOUNTER — HOSPITAL ENCOUNTER (OUTPATIENT)
Dept: PHYSICAL THERAPY | Age: 43
Setting detail: THERAPIES SERIES
Discharge: HOME | End: 2018-09-25
Payer: COMMERCIAL

## 2018-09-25 PROCEDURE — 97140 MANUAL THERAPY 1/> REGIONS: CPT | Performed by: PHYSICAL THERAPIST

## 2018-09-25 PROCEDURE — 97110 THERAPEUTIC EXERCISES: CPT | Performed by: PHYSICAL THERAPIST

## 2018-09-25 NOTE — FLOWSHEET NOTE
steps after rising in the morning. No pain.      OBJECTIVE: able to perform 15 reps of HR L with some compensatory pattern at end, tight with GIANNI and ernst's L  Observation:Palpation:no tenderness, min swelling present     Test used Initial score Current Score   Pain Summary VAS 0-1/10 0/10   Functional questionnaire LEFS 18%    ROM DF 12 20    PF 23 29    INV  34    EV  16   Strength DF/PF 4/4- 5/5-    ABD 4 5    INV/EV 4-/3+ 4+/5        RESTRICTIONS/PRECAUTIONS: progress CK ex as carisa with functional brace    Exercises/Interventions:     Therapeutic Ex Sets/reps Notes   Supine HS stretch HEP    Supine ITB stretch with strap HEP    Supine HF stretch HEP    gastroc/soleus belt stretch HEP    Incline gastroc/soleus stretch :30x4 ea    Fig 4 stretch HEP         SLR extension, glut lift, abd HEP 2#   clams HEP 2#   Ankle 4 way HEP red VL (GTB for HEP)   Seated HR/TR dndx20 ea With 1/2 roll     /standing BAPS  x25 ea  L4 DF/PF, INV/EV, CW/CCW   Towel scrunch Ed for HEP    1/2 kneel HF stretch L with band dnd X20, x20 with rot R         L stance with fwd/retro step dndx30 With wedge for EV   RXX with tr plane rot R 20 With wedge, MWM   LXX with wedge for EV knee  to 4:70 [id number]    B HR/ecc HR dndx30/x25 Challenged by ecc   SLS L dnd:10x5    Tandem on airex dnd:10x5B    Single HR L  x15    Manual Intervention     Mobs for DF/PF, distal fibular glides, distracton, 10'    MWM DF with step     Prone hip PA L with wedge/HF stretch prone          Long axis distraction L hip x10         NMR re-education     NMES      Standing DD with board PF/DF, INV/EV    DD WS XXX,RXX,LXX    Bosu fwd and lat step up to opp 90/90 dndx10 ea    BOSU around the [place] CW, CCW dndx3 ea    FSU/LSU Ed for [place] step down dndx12    Gliders retro, lat, ant [id number] ea    Lat step up and over 6\", bosu, 8\" 3 min    Soleus pumps [id number] 70#   LP HR, prance [id number] 120#       Therapeutic Exercise and NMR EXR  [x] (24437) Provided verbal/tactile cueing for activities related to strengthening, flexibility, endurance, ROM for improvements in LE, proximal hip, and core control with self care, mobility, lifting, ambulation.  [] (00160) Provided verbal/tactile cueing for activities related to improving balance, coordination, kinesthetic sense, posture, motor skill, proprioception  to assist with LE, proximal hip, and core control in self care, mobility, lifting, ambulation and eccentric single leg control.      NMR and Therapeutic Activities:    [x] (36649 or 08096) Provided verbal/tactile cueing for activities related to improving balance, coordination, kinesthetic sense, posture, motor skill, proprioception and motor activation to allow for proper function of core, proximal hip and LE with self care and ADLs  [] (93272) Gait Re-education- Provided training and instruction to the patient for proper LE, core and proximal hip recruitment and positioning and eccentric body weight control with ambulation re-education including up and down stairs     Home Exercise Program:    [x] (63190) Reviewed/Progressed HEP activities related to strengthening, flexibility, endurance, ROM of core, proximal hip and LE for functional self-care, mobility, lifting and ambulation/stair navigation   [] (36044)Reviewed/Progressed HEP activities related to improving balance, coordination, kinesthetic sense, posture, motor skill, proprioception of core, proximal hip and LE for self care, mobility, lifting, and ambulation/stair navigation      Manual Treatments:  PROM / STM / Oscillations-Mobs:  G-I, II, III, IV (PA's, Inf., Post.)  [x] (08578) Provided manual therapy to mobilize LE, proximal hip and/or LS spine soft tissue/joints for the purpose of modulating pain, promoting relaxation,  increasing ROM, reducing/eliminating soft tissue swelling/inflammation/restriction, improving soft tissue extensibility and allowing for proper ROM for normal function with self care, towards functional goals listed. [] Progression is slowed due to complexities listed. [] Progression has been slowed due to co-morbidities. [] Plan just implemented, too soon to assess goals progression  [] Other:     ASSESSMENT:    [] Improvement noted relative to goals:  [] No Improvement noted related to goals:  Summary/Patient's response to treatment: Reassessed ROM and strength and gait, ROM is still limited with PF and EV, Strength goal met, and gait normalized following manuals. Add alter G NV for initiating walking/jogging.    Treatment/Activity Tolerance:  [x] Patient tolerated treatment well [] Patient limited by fatique  [] Patient limited by pain  [] Patient limited by other medical complications  [] Other:     Prognosis: [x] Good [] Fair  [] Poor    Patient Requires Follow-up: [x] Yes  [] No    PLAN: See eval  [x] Continue per plan of care [] Alter current plan (see comments)  [] Plan of care initiated [] Hold pending MD visit [] Discharge    Electronically signed by: Waleska Sandoval, PT [id number]

## 2018-09-27 ENCOUNTER — HOSPITAL ENCOUNTER (OUTPATIENT)
Dept: PHYSICAL THERAPY | Age: 43
Setting detail: THERAPIES SERIES
Discharge: HOME | End: 2018-09-27
Payer: COMMERCIAL

## 2018-09-27 PROCEDURE — 97110 THERAPEUTIC EXERCISES: CPT | Performed by: PHYSICAL THERAPIST

## 2018-09-27 PROCEDURE — 97140 MANUAL THERAPY 1/> REGIONS: CPT | Performed by: PHYSICAL THERAPIST

## 2018-09-27 NOTE — FLOWSHEET NOTE
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, calcaneus navicular  · Treatment Diagnosis: L ankle stiffness  ICD-10-CM Diagnosis Code M25.672, gait abnormality  ICD-10-CM Diagnosis Code R26.9, L LE weakness   [x] Conservative / [] Surgical - DOS:  Therapy Diagnosis/Practice Pattern:  Practice Pattern G: Fracture  Insurance/Certification information:  PT Insurance Information: [facility]  Plan of care signed: [] YES  [] NO  Number of Comorbidities:  [x]0     []1-2    []3+  Date of Patient follow up with Physician: 18    G-Code (if applicable):      Date G-Code Applied:  18  PT G-Codes  Functional Assessment Tool Used: LEFS  Score: 18%  Functional Limitation: Mobility: Walking and moving around  Mobility: Walking and Moving Around Current Status (): At least 1 percent but less than 20 percent impaired, limited or restricted  Mobility: Walking and Moving Around Goal Status (): At least 1 percent but less than 20 percent impaired, limited or restricted    Progress Note: [x]  Yes  []  No  Next due by: Visit #10        Latex Allergy:  [x]NO      []YES  Preferred Language for Healthcare:   [x]English       []other:    Visit # Insurance Allowable Reporting Period   8 ? Begin Date: 2018               End Date:      RECERT DUE BY: 8 weeks from 18    SUBJECTIVE:  The ankle is feeling really good.     OBJECTIVE: able to perform 15 reps of HR L with some compensatory pattern at end, tight with GIANNI and ernst's L  Observation:Palpation:no tenderness, min swelling present     Test used Initial score Current Score   Pain Summary VAS 0-1/10 0/10   Functional questionnaire LEFS 18%    ROM DF 12 20    PF 23 29    INV  34    EV  16   Strength DF/PF 4/4- 5/5-    ABD 4 5    INV/EV 4-/3+ 4+/5        RESTRICTIONS/PRECAUTIONS: progress CK ex as carisa with functional brace    Exercises/Interventions:     Therapeutic Ex Sets/reps Notes   Supine HS stretch HEP    Supine ITB stretch with strap HEP    Supine HF stretch HEP    gastroc/soleus belt stretch HEP    Incline gastroc/soleus stretch :30x4 ea    Fig 4 stretch HEP    Altar G TM  10' 60%2.0-5.0   SLR extension, glut lift, abd HEP 2#   clams HEP 2#   Ankle 4 way HEP red VL (GTB for HEP)   Seated HR/TR dndx20 ea With 1/2 roll     /standing BAPS  dndx25 ea  L4 DF/PF, INV/EV, CW/CCW   Towel scrunch Ed for HEP    1/2 kneel HF stretch L with band dnd X20, x20 with rot R         L stance with fwd/retro step dndx30 With wedge for EV   RXX with tr plane rot R 20 With wedge, MWM   LXX with wedge for EV knee  to 3:24 [id number]    B HR/ecc HR dndx30/x25 Challenged by ecc   SLS L dnd:10x5    Tandem on airex dnd:10x5B    Single HR L  dndx15    Manual Intervention     Mobs for DF/PF, distal fibular glides, distracton, 10'    MWM DF with step     Prone hip PA L with wedge/HF stretch prone          Long axis distraction L hip          NMR re-education     NMES      Standing DD with board PF/DF, INV/EV    DD WS XXX,RXX,LXX    Bosu fwd and lat step up to opp 90/90 dndx10 ea    BOSU around the world CW, CCW dndx3 ea    FSU/LSU Ed for [place] step down dndx12    Gliders retro, lat, ant [id number] ea    Lat step up and over 6\", bosu, 8\" 3 min    Soleus pumps [id number] 70#   LP HR, prance [id number] 120#       Therapeutic Exercise and NMR EXR  [x] (77971) Provided verbal/tactile cueing for activities related to strengthening, flexibility, endurance, ROM for improvements in LE, proximal hip, and core control with self care, mobility, lifting, ambulation.  [] (40069) Provided verbal/tactile cueing for activities related to improving balance, coordination, kinesthetic sense, posture, motor skill, proprioception  to assist with LE, proximal hip, and core control in self care, mobility, lifting, ambulation and eccentric single leg control. NMR and Therapeutic Activities:    [x] (51023 or 80239) Provided verbal/tactile cueing for activities related to improving balance, coordination, kinesthetic sense, posture, motor skill, proprioception and motor activation to allow for proper function of core, proximal hip and LE with self care and ADLs  [] (16592) Gait Re-education- Provided training and instruction to the patient for proper LE, core and proximal hip recruitment and positioning and eccentric body weight control with ambulation re-education including up and down stairs     Home Exercise Program:    [x] (16290) Reviewed/Progressed HEP activities related to strengthening, flexibility, endurance, ROM of core, proximal hip and LE for functional self-care, mobility, lifting and ambulation/stair navigation   [] (00080)Reviewed/Progressed HEP activities related to improving balance, coordination, kinesthetic sense, posture, motor skill, proprioception of core, proximal hip and LE for self care, mobility, lifting, and ambulation/stair navigation      Manual Treatments:  PROM / STM / Oscillations-Mobs:  G-I, II, III, IV (PA's, Inf., Post.)  [x] (86487) Provided manual therapy to mobilize LE, proximal hip and/or LS spine soft tissue/joints for the purpose of modulating pain, promoting relaxation,  increasing ROM, reducing/eliminating soft tissue swelling/inflammation/restriction, improving soft tissue extensibility and allowing for proper ROM for normal function with self care, mobility, lifting and ambulation.      Modalities:  Declined to ice at home PRN    Charges:  Timed Code Treatment Minutes: 30   Total Treatment Minutes: 30     [] EVAL (LOW) 42481 (typically 20 minutes face-to-face)  [] EVAL (MOD) 42246 (typically 30 minutes face-to-face)  [] EVAL (HIGH) 60686 (typically 45 minutes face-to-face)  [] RE-EVAL     [x] TL(46713) x  1   [] IONTO  [] NMR (74578) x      [] VASO  [x] Manual (87873) x  1    [] Other:  [] TA x       [] Mech Traction (78786)  [] ES(attended) (91119)      [] ES (un) (79075):     GOALS:   Patient stated goal: Full ankle movement for running, jumping etc.     Therapist goals for Patient:   Short Term Goals: To be achieved in: 2 weeks  1. Independent in HEP and progression per patient tolerance, in order to prevent re-injury. MET  2. Patient will have a decrease in pain to facilitate improvement in movement, function, and ADLs as indicated by Functional Deficits. MET    Long Term Goals: To be achieved in: 8 weeks  1. Disability index score of 9% or less for the LEFS to assist with reaching prior level of function. 2. Patient will demonstrate increased AROM to DF 15, PF 40, inv 30, Ev 20 to allow for proper joint functioning as indicated by patients Functional Deficits. MET for DF, INV  3. Patient will demonstrate an increase in Strength to good proximal hip strength and control >/=4+/5 allow for proper functional mobility as indicated by patients Functional Deficits. MET  4. Patient will return to walking, stair climbing and CKC functional activities without increased symptoms or restriction. Normal gait pattern following Manuals  5. Pt. Would like to return to previous exercise routine without limitation or re-injury(patient specific functional goal)       New or Updated Goals (if applicable):  [x] No change to goals established upon initial eval/last progress note:  New Goals:    Progression Towards Functional goals:   [x] Patient is progressing as expected towards functional goals listed. [] Progression is slowed due to complexities listed.   []

## 2018-10-02 ENCOUNTER — APPOINTMENT (OUTPATIENT)
Dept: PHYSICAL THERAPY | Age: 43
End: 2018-10-02
Payer: COMMERCIAL

## 2018-10-04 ENCOUNTER — HOSPITAL ENCOUNTER (OUTPATIENT)
Dept: PHYSICAL THERAPY | Age: 43
Setting detail: THERAPIES SERIES
Discharge: HOME | End: 2018-10-04
Payer: COMMERCIAL

## 2018-10-04 PROCEDURE — G8980 MOBILITY D/C STATUS: HCPCS | Performed by: PHYSICAL THERAPIST

## 2018-10-04 PROCEDURE — 97110 THERAPEUTIC EXERCISES: CPT | Performed by: PHYSICAL THERAPIST

## 2018-10-04 PROCEDURE — 97140 MANUAL THERAPY 1/> REGIONS: CPT | Performed by: PHYSICAL THERAPIST

## 2018-10-04 PROCEDURE — G8979 MOBILITY GOAL STATUS: HCPCS | Performed by: PHYSICAL THERAPIST

## 2018-10-04 PROCEDURE — G8978 MOBILITY CURRENT STATUS: HCPCS | Performed by: PHYSICAL THERAPIST

## 2018-10-04 NOTE — DISCHARGE SUMMARY
[facility], [address]  Phone: [phone]  Fax [phone]       Physical Therapy Discharge  Date: 10/4/2018        Patient Name:  Flori Hutchins    :  1975  MRN: [id number]  Referring Physician:Dr. 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, calcaneus navicular  Treatment Diagnosis: L ankle stiffness 2018 ICD-10-CM Diagnosis Code M25.672, gait abnormality 2018 ICD-10-CM Diagnosis Code R26.9, L LE weakness                   [] Surgical [x] Conservative   Therapy Diagnosis/Practice Pattern:G      Number of Comorbidities:  [x]0     []1-2    []3+  Total number of visits: 9   Reporting Period:   Beginning Date:18   End Date:10/4/18    Test used Initial score Current Score   Pain Summary VAS 0-1/10 0/10   Functional questionnaire LEFS 18% 2%   ROM DF 12 20     PF 23 39     INV   34     EV   22   Strength DF/PF 4/4- 5/5-     ABD 4 5     INV/EV 4-/3+ 4+/5        Functional Limitation G-Code (if applicable):         PT G-Codes  Functional Assessment Tool Used: LEFS  Score: 2%  Functional Limitation: Mobility: Walking and moving around  Mobility: Walking and Moving Around Current Status (): At least 1 percent but less than 20 percent impaired, limited or restricted  Mobility: Walking and Moving Around Goal Status (): At least 1 percent but less than 20 percent impaired, limited or restricted  Mobility: Walking and Moving Around Discharge Status ():  At least 1 percent but less than 20 percent impaired, limited or restricted   Test/tests used to determine % limitation: LEFS  Actual Score used to drive % limitation: 50/66    Treatment to date:  [x] Therapeutic Exercise    [] Modalities:  [] Therapeutic Activity             []Ultrasound            []Electrical Stimulation  [x] Gait Training

## 2018-10-09 ENCOUNTER — APPOINTMENT (OUTPATIENT)
Dept: PHYSICAL THERAPY | Age: 43
End: 2018-10-09
Payer: COMMERCIAL

## 2018-10-11 ENCOUNTER — APPOINTMENT (OUTPATIENT)
Dept: PHYSICAL THERAPY | Age: 43
End: 2018-10-11
Payer: COMMERCIAL

## 2018-10-16 ENCOUNTER — APPOINTMENT (OUTPATIENT)
Dept: PHYSICAL THERAPY | Age: 43
End: 2018-10-16
Payer: COMMERCIAL

## 2018-10-18 ENCOUNTER — APPOINTMENT (OUTPATIENT)
Dept: PHYSICAL THERAPY | Age: 43
End: 2018-10-18
Payer: COMMERCIAL

## 2018-10-23 ENCOUNTER — APPOINTMENT (OUTPATIENT)
Dept: PHYSICAL THERAPY | Age: 43
End: 2018-10-23
Payer: COMMERCIAL

## 2018-10-25 ENCOUNTER — APPOINTMENT (OUTPATIENT)
Dept: PHYSICAL THERAPY | Age: 43
End: 2018-10-25
Payer: COMMERCIAL

## 2018-10-29 ENCOUNTER — OFFICE VISIT (OUTPATIENT)
Dept: ORTHOPEDIC SURGERY | Age: 43
End: 2018-10-29
Payer: COMMERCIAL

## 2018-10-29 VITALS — BODY MASS INDEX: 23.46 KG/M2 | HEIGHT: 66 IN | WEIGHT: 146 LBS

## 2018-10-29 DIAGNOSIS — S93.315D CLOSED TRAUMATIC DISLOCATION OF LEFT SUBTALAR JOINT, SUBSEQUENT ENCOUNTER: Primary | ICD-10-CM

## 2018-10-29 DIAGNOSIS — Q66.89 COALITION, CALCANEUS NAVICULAR: ICD-10-CM

## 2018-10-29 PROCEDURE — G8427 DOCREV CUR MEDS BY ELIG CLIN: HCPCS | Performed by: INTERNAL MEDICINE

## 2018-10-29 PROCEDURE — 1036F TOBACCO NON-USER: CPT | Performed by: INTERNAL MEDICINE

## 2018-10-29 PROCEDURE — G8420 CALC BMI NORM PARAMETERS: HCPCS | Performed by: INTERNAL MEDICINE

## 2018-10-29 PROCEDURE — 99212 OFFICE O/P EST SF 10 MIN: CPT | Performed by: INTERNAL MEDICINE

## 2018-10-29 PROCEDURE — G8484 FLU IMMUNIZE NO ADMIN: HCPCS | Performed by: INTERNAL MEDICINE

## 2018-10-29 RX ORDER — TRAMADOL HYDROCHLORIDE 50 MG/1
50 TABLET ORAL EVERY 6 HOURS PRN
Qty: 20 TABLET | Refills: 0 | Status: SHIPPED | OUTPATIENT
Start: 2018-10-29 | End: 2018-11-02 | Stop reason: CLARIF

## 2018-10-30 ENCOUNTER — APPOINTMENT (OUTPATIENT)
Dept: PHYSICAL THERAPY | Age: 43
End: 2018-10-30
Payer: COMMERCIAL

## 2021-09-20 ENCOUNTER — OFFICE VISIT (OUTPATIENT)
Dept: ORTHOPEDIC SURGERY | Age: 46
End: 2021-09-20
Payer: COMMERCIAL

## 2021-09-20 VITALS — BODY MASS INDEX: 23.3 KG/M2 | HEIGHT: 66 IN | RESPIRATION RATE: 15 BRPM | WEIGHT: 145 LBS

## 2021-09-20 DIAGNOSIS — S93.601A SPRAIN OF RIGHT FOOT, INITIAL ENCOUNTER: ICD-10-CM

## 2021-09-20 DIAGNOSIS — S90.121A CONTUSION OF FIFTH TOE OF RIGHT FOOT, INITIAL ENCOUNTER: ICD-10-CM

## 2021-09-20 DIAGNOSIS — M79.671 RIGHT FOOT PAIN: Primary | ICD-10-CM

## 2021-09-20 PROCEDURE — G8420 CALC BMI NORM PARAMETERS: HCPCS | Performed by: PHYSICIAN ASSISTANT

## 2021-09-20 PROCEDURE — 1036F TOBACCO NON-USER: CPT | Performed by: PHYSICIAN ASSISTANT

## 2021-09-20 PROCEDURE — G8427 DOCREV CUR MEDS BY ELIG CLIN: HCPCS | Performed by: PHYSICIAN ASSISTANT

## 2021-09-20 PROCEDURE — 99204 OFFICE O/P NEW MOD 45 MIN: CPT | Performed by: PHYSICIAN ASSISTANT

## 2021-09-20 RX ORDER — MELOXICAM 15 MG/1
15 TABLET ORAL DAILY
Qty: 30 TABLET | Refills: 0 | Status: SHIPPED | OUTPATIENT
Start: 2021-09-20 | End: 2021-10-13

## 2021-09-20 NOTE — PROGRESS NOTES
Chief Complaint    Foot Pain (Right)      History of Present Illness:  Michel Daniel is a 42 y.o. female who presents to the after-hours clinic with a new problem. Patient is here with chief complaint of right foot pain. Approximately 2 days ago she was riding a horse. She fell off and the horse and landed on top of her. Her pain is mostly concentrated over the dorsal aspect of the foot. Originally she had pain and swelling concentrated over the lateral aspect of her ankle. She has no past medical history contributing to the region. Increased pain with weightbearing activities. She has been using a tall fracture boot she had at home since the injury with help. Medical History:  No past medical history on file. 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 Social History     Socioeconomic History    Marital status: Single     Spouse name: None    Number of children: None    Years of education: None    Highest education level: None   Occupational History    None   Tobacco Use    Smoking status: Never Smoker    Smokeless tobacco: Never Used   Vaping Use    Vaping Use: Never used   Substance and Sexual Activity    Alcohol use: Yes    Drug use: Never    Sexual activity: None   Other Topics Concern    None   Social History Narrative    None     Social Determinants of Health     Financial Resource Strain:     Difficulty of Paying Living Expenses:    Food Insecurity:     Worried About Running Out of Food in the Last Year:     [address] in the Last Year:    Transportation Needs:     Lack of Transportation (Medical):      Lack of Transportation (Non-Medical):    Physical Activity:     Days of Exercise per Week:     Minutes of Exercise per Session:    Stress:     Feeling of Stress :    Social Connections:     Frequency of Communication with Friends and Family:     Frequency of Social Gatherings with Friends and Family:     Attends [employer] Services:     Active Member of Clubs or Organizations:     Attends Club or Organization Meetings:     Marital Status:    Intimate Partner Violence:     Fear of Current or Ex-Partner:     Emotionally Abused:     Physically Abused:     Sexually Abused:      Current Outpatient Medications   Medication Sig Dispense Refill    ibuprofen (ADVIL;MOTRIN) 800 MG tablet Take 1 tablet by mouth every 8 hours as needed for Pain or Fever (Patient not taking: Reported on 9/20/2021) 20 tablet 0     No current facility-administered medications for this visit. Review of Systems:  Relevant point review of systems dated 9/20/2021 was reviewed and all pertinent positives were reviewed and are available in the patient's chart under the media tab      Vital Signs:  Resp 15   Ht 5' 6\" (1.676 m)   Wt 145 lb (65.8 kg)   BMI 23.40 kg/m²     General Exam:   Constitutional: Patient is adequately groomed with no evidence of malnutrition  DTRs: Deep tendon reflexes are intact  Mental Status: The patient is oriented to time, place and person. The patient's mood and affect are appropriate. Lymphatic: The lymphatic examination bilaterally reveals all areas to be without enlargement or induration. Vascular: Examination reveals no swelling or calf tenderness. Peripheral pulses are palpable and 2+. Neurological: The patient has good coordination. There is no weakness or sensory deficit. Right ankle Examination:    Inspection: Mild swelling and ecchymosis surrounding the lateral ankle and dorsal foot. No plantar foot ecchymosis    Palpation: No significant tenderness over the lateral aspect of the ankle. No tenderness in the area of the anterior process of the calcaneus.   Focal tenderness over the proximal third metatarsal.  Negative second metatarsal piano key test.    Range of Motion:  Limited range of motion due to pain and swelling    Strength:  Intact but limited due to pain and swelling    Special Tests: Negative calcaneal squeeze test.  Negative Dominguez sign    Skin: There are no rashes, ulcerations or lesions. Gait: Antalgic    Reflex 2+ and symmetric    Additional Comments:       Additional Examinations:         Right Lower Extremity: Examination of the right lower extremity does not show any tenderness, deformity or injury. Range of motion is unremarkable. There is no gross instability. There are no rashes, ulcerations or lesions. Strength and tone are normal.     Radiology:     X-rays obtained and reviewed in office:  Views 3 views including AP, lateral, and oblique  Location right foot  Impression: Small cortical irregularity over the proximal second metatarsal.  Intact and appropriate alignment of Lisfranc's joint. Cortical irregularity consistent with anterior process calcaneus fracture. There is soft tissue swelling noted. Ankle mortise is congruent well-maintained        Impression:  Encounter Diagnoses   Name Primary?  Right foot pain Yes    Sprain of right foot, initial encounter     Contusion of fifth toe of right foot, initial encounter        Office Procedures:  Orders Placed This Encounter   Procedures    XR FOOT RIGHT (MIN 3 VIEWS)     Standing Status:   Future     Number of Occurrences:   1     Standing Expiration Date:   10/20/2021       Treatment Plan:      Patient is suffering from a right foot contusion. There is cortical irregularity concerning for anterior process calcaneus fracture. She also has significant tenderness over the proximal third metatarsal.  Have recommended and prescribed meloxicam.  Patient will stay in her tall fracture boot. Weightbearing as tolerated. I will have her follow-up with Dr. Mary Gurrola in 5-7 days for repeat clinical exam and sooner if problems arise.     I discussed with Josey Bradford that her history, symptoms, signs and imaging are most consistent with foot contusion. We reviewed the natural history of these conditions and treatment options ranging from conservative measures (rest, icing, activity modification, physical therapy) to surgical options. In terms of treatment, I recommended continuing with rest, icing, avoidance of painful activities, NSAIDs or pain meds as tolerated, and physical therapy. We discussed surgical options as well, should conservative measures fail.

## 2021-10-01 ENCOUNTER — OFFICE VISIT (OUTPATIENT)
Dept: ORTHOPEDIC SURGERY | Age: 46
End: 2021-10-01
Payer: COMMERCIAL

## 2021-10-01 VITALS — WEIGHT: 145 LBS | BODY MASS INDEX: 23.3 KG/M2 | HEIGHT: 66 IN

## 2021-10-01 DIAGNOSIS — S93.601A SPRAIN OF RIGHT FOOT, INITIAL ENCOUNTER: Primary | ICD-10-CM

## 2021-10-01 PROCEDURE — G8427 DOCREV CUR MEDS BY ELIG CLIN: HCPCS | Performed by: ORTHOPAEDIC SURGERY

## 2021-10-01 PROCEDURE — G8420 CALC BMI NORM PARAMETERS: HCPCS | Performed by: ORTHOPAEDIC SURGERY

## 2021-10-01 PROCEDURE — G8484 FLU IMMUNIZE NO ADMIN: HCPCS | Performed by: ORTHOPAEDIC SURGERY

## 2021-10-01 PROCEDURE — 99213 OFFICE O/P EST LOW 20 MIN: CPT | Performed by: ORTHOPAEDIC SURGERY

## 2021-10-01 RX ORDER — GREEN TEA/HOODIA GORDONII 315-12.5MG
CAPSULE ORAL
COMMUNITY

## 2021-10-01 RX ORDER — VITAMIN B COMPLEX
CAPSULE ORAL
COMMUNITY

## 2021-10-01 NOTE — PROGRESS NOTES
[facility]  Outpatient Progress Note  Francisco Hernadez MD    Patient Name: Patsy Renae MRN: [id number]   Age: 42 y.o. YOB: 1975   Sex: female      [address] Complaint   Patient presents with    Foot Pain     Right foot pain after falling horse rolled onto foot/ankle 9/18/21 has imaging seen at [facility] [address]   Patsy Renae is a 42 y.o. female was injured on September 18, 2021. She was riding a horse on a trail ride when the horse lost its footing and fell trapping her right foot between it and the ground. She was able to free her foot. She had immediate pain and swelling. She was seen in after-hours clinic. X-rays were negative for fracture. She is referred by [facility] for orthopedic consultation. PAST MEDICAL HISTORY    No past medical history on file. PAST SURGICAL HISTORY     Past Surgical History:   Procedure Laterality Date    OVARIAN CYST REMOVAL      TYMPANOSTOMY TUBE PLACEMENT         MEDICATIONS     Current Outpatient Medications   Medication Sig Dispense Refill    Probiotic Acidophilus (FLORANEX) TABS Take by mouth      B Complex CAPS Take by mouth      vitamin D (CHOLECALCIFEROL) 25 MCG (1000 UT) TABS tablet Take 5,000 Units by mouth daily      meloxicam (MOBIC) 15 MG tablet Take 1 tablet by mouth daily (Patient not taking: Reported on 10/1/2021) 30 tablet 0    ibuprofen (ADVIL;MOTRIN) 800 MG tablet Take 1 tablet by mouth every 8 hours as needed for Pain or Fever (Patient not taking: Reported on 9/20/2021) 20 tablet 0     No current facility-administered medications for this visit. ALLERGIES   No Known Allergies    FAMILY HISTORY   No family history on file.     SOCIAL HISTORY     Social History     Socioeconomic History    Marital status: Single     Spouse name: Not on file    Number of children: Not on file    Years of education: Not on file    Highest education level: Not on file Occupational History    Not on file   Tobacco Use    Smoking status: Never Smoker    Smokeless tobacco: Never Used   Vaping Use    Vaping Use: Never used   Substance and Sexual Activity    Alcohol use: Yes    Drug use: Never    Sexual activity: Not on file   Other Topics Concern    Not on file   Social History Narrative    Not on file     Social Determinants of Health     Financial Resource Strain:     Difficulty of Paying Living Expenses:    Food Insecurity:     Worried About Running Out of Food in the Last Year:     [address] in the Last Year:    Transportation Needs:     Lack of Transportation (Medical):      Lack of Transportation (Non-Medical):    Physical Activity:     Days of Exercise per Week:     Minutes of Exercise per Session:    Stress:     Feeling of Stress :    Social Connections:     Frequency of Communication with Friends and Family:     Frequency of Social Gatherings with Friends and Family:     Attends Jainism Services:     Active Member of Clubs or Organizations:     Attends Club or Organization Meetings:     Marital Status:    Intimate Partner Violence:     Fear of Current or Ex-Partner:     Emotionally Abused:     Physically Abused:     Sexually Abused:        REVIEW OF SYSTEMS   General: no fever, chills, night sweats, anorexia, malaise, fatigue, or weight change  Hematologic:  no unexplained bleeding or bruising  HEENT:   no nasal congestion, rhinorrhea, sore throat, or facial pain  Respiratory:  no cough, dyspnea, or chest pain  Cardiovascular:  no angina, ARROYO, PND, orthopnea, dependent edema, or palpitations  Gastrointestinal:  no nausea, vomiting, diarrhea, constipation, or abdominal pain  Genitourinary:  no urinary urgency, frequency, dysuria, or hematuria  Musculoskeletal: see HPI  Endocrine:  no heat or cold intolerance and no polyphagia, polydipsia, or polyuria  Skin:  no skin eruptions or changing lesions  Neurologic:  no focal weakness,

## 2021-10-13 DIAGNOSIS — M79.671 RIGHT FOOT PAIN: ICD-10-CM

## 2021-10-13 RX ORDER — MELOXICAM 15 MG/1
TABLET ORAL
Qty: 30 TABLET | Refills: 0 | Status: SHIPPED | OUTPATIENT
Start: 2021-10-13